# Patient Record
(demographics unavailable — no encounter records)

---

## 2025-05-23 NOTE — CONSULT LETTER
[Dear  ___] : Dear  [unfilled], [FreeTextEntry3] : Oyl Willis MD  Division of Pediatric, General, Thoracic and Endoscopic Surgery  [facility]

## 2025-05-23 NOTE — ADDENDUM
[FreeTextEntry1] : Documented by Kenneth Ugarte acting as a scribe for Dr. Willis on 05/12/2025.   All medical record entries made by the Scribe were at my, Dr. Willis, direction and personally dictated by me on 05/12/2025. I have reviewed the chart and agree that the record accurately reflects my personal performances of the history, physical exam, assessment and plan. I have also personally directed, reviewed, and agree with the instructions.

## 2025-05-23 NOTE — REASON FOR VISIT
[Initial - Scheduled] : an initial, scheduled visit with concerns of [Chest wall deformity] : chest wall deformity [Patient] : patient [Parents] : parents [FreeTextEntry4] : Leidy Walden MD [Father] : father [Medical Records] : medical records

## 2025-05-23 NOTE — CONSULT LETTER
[Dear  ___] : Dear  [unfilled], [FreeTextEntry3] : Oly Willis MD  Division of Pediatric, General, Thoracic and Endoscopic Surgery  [facility]

## 2025-05-23 NOTE — ASSESSMENT
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 She is otherwise healthy and is unbothered by the appearance of her chest. She does occasionally feel some chest discomfort with certain motions. I counseled Leslie and her father and began by educating them on the natural history of pectus deformities and what these entail. I then reviewed treatment options to correct Leslie's carinatum deformity including the dynamic bracing program. However, given her young age and mild carinatum, I explained to the family that we would not proceed with any bracing until Leslie has reached her teenage years, as the protrusion may become more prominent as she continues to grow and undergo puberty. After our discussion, the family verbalized their understanding, and we agreed to monitor the chest wall moving forward with routine annual follow ups until Leslie reaches 13 years old. We also discussed the association of Marfan's Syndrome with pectus. Although phenotypically Leslie does not have significant findings, I recommend that all patients with chest wall deformities be formally evaluated in the [facility] at [facility] [place]. The family was given the contact information to make an appointment. We will follow up next year. They have my information and know to contact me sooner with any questions or concerns.

## 2025-05-23 NOTE — REASON FOR VISIT
[Initial - Scheduled] : an initial, scheduled visit with concerns of [Chest wall deformity] : chest wall deformity [Patient] : patient [Parents] : parents [FreeTextEntry4] : Leidy Walden MD [Father] : father [Medical Records] : medical records (1) Other Medications/None

## 2025-05-23 NOTE — CONSULT LETTER
[Dear  ___] : Dear  [unfilled], [FreeTextEntry3] : Oly Willis MD  Division of Pediatric, General, Thoracic and Endoscopic Surgery  [facility]

## 2025-05-23 NOTE — HISTORY OF PRESENT ILLNESS
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 The family noticed a protrusion on her chest wall a few years ago but are not sure if it has changed in appearance since then. Leslie reports some occasional chest discomfort at times. No SOB or activity intolerance reported. She has not seen a cardiologist for the chest discomfort. She is otherwise a healthy active girl. The appearance of her chest does not bother her. She is otherwise healthy and doing well.

## 2025-05-23 NOTE — ASSESSMENT
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 She is otherwise healthy and is unbothered by the appearance of her chest. She does occasionally feel some chest discomfort with certain motions. I counseled Leslie and her father and began by educating them on the natural history of pectus deformities and what these entail. I then reviewed treatment options to correct Leslie's carinatum deformity including the dynamic bracing program. However, given her young age and mild carinatum, I explained to the family that we would not proceed with any bracing until Leslie has reached her teenage years, as the protrusion may become more prominent as she continues to grow and undergo puberty. After our discussion, the family verbalized their understanding, and we agreed to monitor the chest wall moving forward with routine annual follow ups until Leslie reaches 13 years old. We also discussed the association of Marfan's Syndrome with pectus. Although phenotypically Leslie does not have significant findings, I recommend that all patients with chest wall deformities be formally evaluated in the [facility] at [facility]. The family was given the contact information to make an appointment. We will follow up next year. They have my information and know to contact me sooner with any questions or concerns.

## 2025-05-23 NOTE — ASSESSMENT
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 She is otherwise healthy and is unbothered by the appearance of her chest. She does occasionally feel some chest discomfort with certain motions. I counseled Leslie and her father and began by educating them on the natural history of pectus deformities and what these entail. I then reviewed treatment options to correct Leslie's carinatum deformity including the dynamic bracing program. However, given her young age and mild carinatum, I explained to the family that we would not proceed with any bracing until Leslie has reached her teenage years, as the protrusion may become more prominent as she continues to grow and undergo puberty. After our discussion, the family verbalized their understanding, and we agreed to monitor the chest wall moving forward with routine annual follow ups until Leslie reaches 13 years old. We also discussed the association of Marfan's Syndrome with pectus. Although phenotypically Leslie does not have significant findings, I recommend that all patients with chest wall deformities be formally evaluated in the [facility] at [facility] [place]. The family was given the contact information to make an appointment. We will follow up next year. They have my information and know to contact me sooner with any questions or concerns.

## 2025-05-23 NOTE — PHYSICAL EXAM
[NL] : grossly intact [FreeTextEntry4] : Minimal to mild pectus carinatum deformity, symmetric, chest wall is flexible [TextBox_73] : Negative thumb and wrist sign bilaterally